# Patient Record
Sex: FEMALE | Race: WHITE | URBAN - METROPOLITAN AREA
[De-identification: names, ages, dates, MRNs, and addresses within clinical notes are randomized per-mention and may not be internally consistent; named-entity substitution may affect disease eponyms.]

---

## 2017-07-12 DIAGNOSIS — I35.0 AORTIC STENOSIS: Primary | ICD-10-CM

## 2017-07-13 DIAGNOSIS — I63.9 CEREBROVASCULAR ACCIDENT (H): Primary | ICD-10-CM

## 2017-07-13 DIAGNOSIS — I35.0 AORTIC STENOSIS: ICD-10-CM

## 2017-07-13 DIAGNOSIS — I35.0 AORTIC STENOSIS: Primary | ICD-10-CM

## 2017-07-13 RX ORDER — ASPIRIN 81 MG/1
81 TABLET ORAL DAILY
Qty: 30 TABLET | Refills: 0
Start: 2017-07-13

## 2017-07-13 RX ORDER — LIDOCAINE 40 MG/G
CREAM TOPICAL
Status: CANCELLED | OUTPATIENT
Start: 2017-07-13

## 2017-07-13 RX ORDER — CEFAZOLIN SODIUM 2 G/100ML
2 INJECTION, SOLUTION INTRAVENOUS
Status: CANCELLED | OUTPATIENT
Start: 2017-07-13

## 2017-07-13 RX ORDER — SODIUM CHLORIDE 9 MG/ML
INJECTION, SOLUTION INTRAVENOUS CONTINUOUS
Status: CANCELLED | OUTPATIENT
Start: 2017-07-13

## 2017-07-13 RX ORDER — ASPIRIN 81 MG/1
81 TABLET ORAL DAILY
Status: CANCELLED | OUTPATIENT
Start: 2017-07-13

## 2017-07-13 RX ORDER — CLOPIDOGREL BISULFATE 75 MG/1
75 TABLET ORAL ONCE
Status: CANCELLED | OUTPATIENT
Start: 2017-07-13 | End: 2017-07-13

## 2017-07-13 RX ORDER — CLOPIDOGREL BISULFATE 75 MG/1
75 TABLET ORAL DAILY
Qty: 30 TABLET | Refills: 0
Start: 2017-07-13

## 2017-07-13 NOTE — NURSING NOTE
Diagnosis: Severe aortic stenosis.  Woodson Beto S3 valve = 29 mm  Left transfemoral approach  Do not open surgical packs  Perfusion on standby  Plan:  1. TAVR procedure scheduled for 7/18/2017.  The hospital will call patient to tell them time of surgery.  2. Medications changes.  - continue ASA and Plavix including the AM of surgery  - hold Prinivil x 2 days prior to surgery      If any questions please contact:  Rose Love RN  Structural Heart Care Coordinator  [facility]  Office: [phone]  Pager: [phone]

## 2017-07-17 RX ORDER — CARBOXYMETHYLCELLULOSE SODIUM 5 MG/ML
1 SOLUTION/ DROPS OPHTHALMIC DAILY PRN
COMMUNITY

## 2017-07-18 ENCOUNTER — HOSPITAL ENCOUNTER (OUTPATIENT)
Facility: CLINIC | Age: 82
Discharge: HOME | End: 2017-07-18
Attending: INTERNAL MEDICINE | Admitting: INTERNAL MEDICINE

## 2017-07-18 ENCOUNTER — SURGERY (OUTPATIENT)
Age: 82
End: 2017-07-18

## 2017-07-18 ENCOUNTER — ANESTHESIA EVENT (OUTPATIENT)
Dept: SURGERY | Facility: CLINIC | Age: 82
End: 2017-07-18

## 2017-07-18 ENCOUNTER — ANESTHESIA (OUTPATIENT)
Dept: SURGERY | Facility: CLINIC | Age: 82
End: 2017-07-18

## 2017-07-18 VITALS
HEIGHT: 62 IN | BODY MASS INDEX: 22.08 KG/M2 | SYSTOLIC BLOOD PRESSURE: 101 MMHG | DIASTOLIC BLOOD PRESSURE: 60 MMHG | WEIGHT: 120 LBS | OXYGEN SATURATION: 97 % | RESPIRATION RATE: 17 BRPM | TEMPERATURE: 98.1 F

## 2017-07-18 DIAGNOSIS — I35.0 AORTIC STENOSIS: ICD-10-CM

## 2017-07-18 RX ORDER — NITROGLYCERIN 0.4 MG/1
0.4 TABLET SUBLINGUAL EVERY 5 MIN PRN
Status: DISCONTINUED | OUTPATIENT
Start: 2017-07-18 | End: 2017-08-15 | Stop reason: HOSPADM

## 2017-07-18 RX ORDER — LIDOCAINE 40 MG/G
CREAM TOPICAL
Status: DISCONTINUED | OUTPATIENT
Start: 2017-07-18 | End: 2017-08-15 | Stop reason: HOSPADM

## 2017-07-18 RX ORDER — ASPIRIN 81 MG/1
81 TABLET ORAL DAILY
Status: DISCONTINUED | OUTPATIENT
Start: 2017-07-19 | End: 2017-08-15 | Stop reason: HOSPADM

## 2017-07-18 RX ORDER — CLOPIDOGREL BISULFATE 75 MG/1
75 TABLET ORAL ONCE
Status: DISCONTINUED | OUTPATIENT
Start: 2017-07-18 | End: 2017-07-18 | Stop reason: HOSPADM

## 2017-07-18 RX ORDER — ASPIRIN 81 MG/1
81 TABLET ORAL DAILY
Status: DISCONTINUED | OUTPATIENT
Start: 2017-07-18 | End: 2017-07-18 | Stop reason: HOSPADM

## 2017-07-18 RX ORDER — LIDOCAINE 40 MG/G
CREAM TOPICAL
Status: DISCONTINUED | OUTPATIENT
Start: 2017-07-18 | End: 2017-07-18 | Stop reason: HOSPADM

## 2017-07-18 RX ORDER — ONDANSETRON 4 MG/1
4 TABLET, ORALLY DISINTEGRATING ORAL EVERY 30 MIN PRN
Status: DISCONTINUED | OUTPATIENT
Start: 2017-07-18 | End: 2017-08-15 | Stop reason: HOSPADM

## 2017-07-18 RX ORDER — ACETAMINOPHEN 325 MG/1
325-650 TABLET ORAL EVERY 4 HOURS PRN
Status: DISCONTINUED | OUTPATIENT
Start: 2017-07-18 | End: 2017-08-15 | Stop reason: HOSPADM

## 2017-07-18 RX ORDER — CEFAZOLIN SODIUM 2 G/100ML
2 INJECTION, SOLUTION INTRAVENOUS
Status: DISCONTINUED | OUTPATIENT
Start: 2017-07-18 | End: 2017-07-18 | Stop reason: HOSPADM

## 2017-07-18 RX ORDER — CLOPIDOGREL BISULFATE 75 MG/1
75 TABLET ORAL DAILY
Status: DISCONTINUED | OUTPATIENT
Start: 2017-07-19 | End: 2017-08-15 | Stop reason: HOSPADM

## 2017-07-18 RX ORDER — SODIUM CHLORIDE 9 MG/ML
INJECTION, SOLUTION INTRAVENOUS CONTINUOUS
Status: ACTIVE | OUTPATIENT
Start: 2017-07-18 | End: 2017-07-18

## 2017-07-18 RX ORDER — SODIUM CHLORIDE, SODIUM LACTATE, POTASSIUM CHLORIDE, CALCIUM CHLORIDE 600; 310; 30; 20 MG/100ML; MG/100ML; MG/100ML; MG/100ML
INJECTION, SOLUTION INTRAVENOUS CONTINUOUS
Status: DISCONTINUED | OUTPATIENT
Start: 2017-07-18 | End: 2017-08-15 | Stop reason: HOSPADM

## 2017-07-18 RX ORDER — FENTANYL CITRATE 50 UG/ML
25-50 INJECTION, SOLUTION INTRAMUSCULAR; INTRAVENOUS
Status: DISCONTINUED | OUTPATIENT
Start: 2017-07-18 | End: 2017-08-15 | Stop reason: HOSPADM

## 2017-07-18 RX ORDER — ONDANSETRON 2 MG/ML
4 INJECTION INTRAMUSCULAR; INTRAVENOUS EVERY 30 MIN PRN
Status: DISCONTINUED | OUTPATIENT
Start: 2017-07-18 | End: 2017-08-15 | Stop reason: HOSPADM

## 2017-07-18 RX ORDER — NALOXONE HYDROCHLORIDE 0.4 MG/ML
.1-.4 INJECTION, SOLUTION INTRAMUSCULAR; INTRAVENOUS; SUBCUTANEOUS
Status: DISCONTINUED | OUTPATIENT
Start: 2017-07-18 | End: 2017-08-15 | Stop reason: HOSPADM

## 2017-07-18 RX ORDER — SODIUM CHLORIDE 9 MG/ML
INJECTION, SOLUTION INTRAVENOUS CONTINUOUS
Status: DISCONTINUED | OUTPATIENT
Start: 2017-07-18 | End: 2017-07-18 | Stop reason: HOSPADM

## 2017-07-18 ASSESSMENT — LIFESTYLE VARIABLES: TOBACCO_USE: 1

## 2017-07-18 ASSESSMENT — VISUAL ACUITY: OU: NORMAL ACUITY

## 2017-07-18 NOTE — PROGRESS NOTES
Admission medication history interview status for the 7/18/2017  admission is complete. See EPIC admission navigator for prior to admission medications     Medication history source reliability:Good    Medication history interview source(s):Patient    Medication history resources (including written lists, pill bottles, clinic record):None    Primary pharmacy.[facility]    Additional medication history information not noted on PTA med list :None    Time spent in this activity: 40 minutes    Prior to Admission medications    Medication Sig Last Dose Taking? Auth Provider   sodium chloride (OCEAN) 0.65 % nasal spray Spray 1 spray into both nostrils daily as needed for congestion More than a Month at PRN Yes Reported, Patient   Sildenafil Citrate (REVATIO PO) Take 5 mg by mouth 3 times daily  7/17/2017 at 1900 Yes Reported, Patient   GABAPENTIN PO Take 100 mg by mouth 3 times daily 7/17/2017 at 1900 Yes Reported, Patient   Isosorbide Mononitrate (IMDUR PO) Take 30 mg by mouth daily 7/17/2017 at AM Yes Reported, Patient   LISINOPRIL PO Take 5 mg by mouth daily 7/17/2017 at AM Yes Reported, Patient   Metoprolol Succinate (TOPROL XL PO) Take 50 mg by mouth At Bedtime  7/17/2017 at PM Yes Reported, Patient   Multiple Vitamins-Minerals (ONE DAILY FOR WOMEN 50+ ADV PO) Take 1 tablet by mouth daily (with lunch)  7/17/2017 at lunchtime Yes Reported, Patient   Multiple Vitamins-Minerals (PRESERVISION AREDS 2 PO) Take 1 tablet by mouth daily (with lunch)  7/17/2017 at lunchtime Yes Reported, Patient   carboxymethylcellulose (REFRESH PLUS) 0.5 % SOLN ophthalmic solution Place 1 drop into both eyes daily as needed for dry eyes Past Week at PRN Yes Reported, Patient   MELATONIN PO Take 3 mg by mouth nightly as needed 7/16/2017 at PM Yes Reported, Patient   clopidogrel (PLAVIX) 75 MG tablet Take 1 tablet (75 mg) by mouth daily 7/17/2017 at 0800 Yes Casimiro Marte MD   aspirin EC 81 MG EC tablet Take 1 tablet (81 mg) by mouth  daily 7/17/2017 at 0800 Yes Casimiro Marte MD

## 2017-07-18 NOTE — ANESTHESIA PREPROCEDURE EVALUATION
Procedure: Procedure(s):  TRANSCATHETER AORTIC VALVE IMPLANT ANESTHESIA  Preop diagnosis: SEE CHART    No Known Allergies  History reviewed. No pertinent past medical history.  History reviewed. No pertinent surgical history.  Prior to Admission medications    Medication Sig Start Date End Date Taking? Authorizing Provider   sodium chloride (OCEAN) 0.65 % nasal spray Spray 1 spray into both nostrils daily as needed for congestion   Yes Reported, Patient   Sildenafil Citrate (REVATIO PO) Take 5 mg by mouth 3 times daily    Yes Reported, Patient   GABAPENTIN PO Take 100 mg by mouth 3 times daily   Yes Reported, Patient   Isosorbide Mononitrate (IMDUR PO) Take 30 mg by mouth daily   Yes Reported, Patient   LISINOPRIL PO Take 5 mg by mouth daily   Yes Reported, Patient   Metoprolol Succinate (TOPROL XL PO) Take 50 mg by mouth At Bedtime    Yes Reported, Patient   Multiple Vitamins-Minerals (ONE DAILY FOR WOMEN 50+ ADV PO) Take 1 tablet by mouth daily (with lunch)    Yes Reported, Patient   Multiple Vitamins-Minerals (PRESERVISION AREDS 2 PO) Take 1 tablet by mouth daily (with lunch)    Yes Reported, Patient   carboxymethylcellulose (REFRESH PLUS) 0.5 % SOLN ophthalmic solution Place 1 drop into both eyes daily as needed for dry eyes   Yes Reported, Patient   MELATONIN PO Take 3 mg by mouth nightly as needed   Yes Reported, Patient   clopidogrel (PLAVIX) 75 MG tablet Take 1 tablet (75 mg) by mouth daily 7/13/17  Yes Casimiro Marte MD   aspirin EC 81 MG EC tablet Take 1 tablet (81 mg) by mouth daily 7/13/17  Yes Casimiro Marte MD     Current Facility-Administered Medications Ordered in Epic   Medication Dose Route Frequency Last Rate Last Dose     lidocaine 1 % 1 mL  1 mL Other Q1H PRN         lidocaine (LMX4) cream   Topical Q1H PRN         sodium chloride (PF) 0.9% PF flush 3 mL  3 mL Intracatheter Q1H PRN         sodium chloride (PF) 0.9% PF flush 3 mL  3 mL Intracatheter Q8H         lidocaine (LMX4)  cream   Topical Q1H PRN         lidocaine 1 % 1 mL  1 mL Other Q1H PRN         sodium chloride (PF) 0.9% PF flush 3 mL  3 mL Intracatheter Q1H PRN         sodium chloride (PF) 0.9% PF flush 3 mL  3 mL Intracatheter Q8H         0.9% sodium chloride infusion   Intravenous Continuous         aspirin EC EC tablet 81 mg  81 mg Oral Daily         ceFAZolin sodium-dextrose (ANCEF) infusion 2 g  2 g Intravenous Pre-Op/Pre-procedure x 1 dose         clopidogrel (PLAVIX) tablet 75 mg  75 mg Oral Once         No current [facility]-ordered outpatient prescriptions on file.     Wt Readings from Last 1 Encounters:   07/18/17 54.4 kg (120 lb)     Temp Readings from Last 1 Encounters:   07/18/17 36.7  C (98.1  F) (Temporal)     BP Readings from Last 6 Encounters:   07/18/17 98/70     Pulse Readings from Last 4 Encounters:   No data found for Pulse     Resp Readings from Last 1 Encounters:   07/18/17 12     SpO2 Readings from Last 1 Encounters:   07/18/17 96%     No results for input(s): NA, POTASSIUM, CHLORIDE, CO2, ANIONGAP, GLC, BUN, CR, KRYSTIAN in the last 70966 hours.  No results for input(s): WBC, HGB, PLT in the last 35522 hours.  No results for input(s): INR in the last 33104 hours.    Invalid input(s): APTT   RECENT LABS:   ECG:   ECHO:   CXR:    Anesthesia Evaluation     .             ROS/MED HX    ENT/Pulmonary:     (+)tobacco use, Past use , . .   (-) sleep apnea   Neurologic:       Cardiovascular:     (+) ----. : . . . :. valvular problems/murmurs type: AS . pulmonary hypertension,       METS/Exercise Tolerance:  >4 METS   Hematologic:         Musculoskeletal:         GI/Hepatic:        (-) GERD   Renal/Genitourinary:         Endo:         Psychiatric:         Infectious Disease:         Malignancy:         Other:                     Physical Exam  Normal systems: pulmonary and dental    Airway   Mallampati: III  TM distance: >3 FB  Neck ROM: full    Dental     Cardiovascular   Rhythm and rate: regular and normal  (+) murmur        Pulmonary                     Anesthesia Plan      History & Physical Review  History and physical reviewed and following examination; no interval change.    ASA Status:  3 .    NPO Status:  > 8 hours    Plan for General and ETT with Intravenous and Propofol induction. Maintenance will be Inhalation.    PONV prophylaxis:  Ondansetron (or other 5HT-3)  Additional equipment: Videolaryngoscope, Arterial Line, Central Line and CVP      Postoperative Care  Postoperative pain management:  IV analgesics and Oral pain medications.      Consents  Anesthetic plan, risks, benefits and alternatives discussed with:  Patient.  Use of blood products discussed: Yes.   Use of blood products discussed with Patient.  .                          .

## 2017-07-18 NOTE — PROGRESS NOTES
Successful transfemoral transcatheter aortic valve replacement vis left transfemoral route with 14 Nepali catheter.  Woodson 23 mm valve.  No complications.